# Patient Record
Sex: FEMALE | Race: WHITE
[De-identification: names, ages, dates, MRNs, and addresses within clinical notes are randomized per-mention and may not be internally consistent; named-entity substitution may affect disease eponyms.]

---

## 2017-07-15 ENCOUNTER — HEALTH MAINTENANCE LETTER (OUTPATIENT)
Age: 62
End: 2017-07-15

## 2018-03-05 NOTE — PROGRESS NOTES
"     SUBJECTIVE:   CC: Moy Huerta is an 62 year old woman who presents for preventive health visit.     Healthy Habits:    Do you get at least three servings of calcium containing foods daily (dairy, green leafy vegetables, etc.)? yes    Amount of exercise or daily activities, outside of work: 0 day(s) per week in winter more in summer-biking    Problems taking medications regularly No    Medication side effects: No    Have you had an eye exam in the past two years? no    Do you see a dentist twice per year? yes    Do you have sleep apnea, excessive snoring or daytime drowsiness? works third shift.      Today's PHQ-2 Score:   PHQ-2 ( 1999 Pf[employer]) 3/6/2018 5/11/2012   Q1: Little interest or pleasure in doing things 0 1   Q2: Feeling down, depressed or hopeless 0 1   PHQ-2 Score 0 2       Abuse: Current or Past(Physical, Sexual or Emotional)- No  Do you feel safe in your environment - Yes    Social History   Substance Use Topics     Smoking status: Never Smoker     Smokeless tobacco: Never Used     Alcohol use No     If you drink alcohol do you typically have >3 drinks per day or >7 drinks per week? No                     Reviewed orders with patient.  Reviewed health maintenance and updated orders accordingly - Yes  Labs reviewed in EPIC    Patient under age 50, mutual decision reflected in health maintenance.      Pertinent mammograms are reviewed under the imaging tab.  History of abnormal Pap smear: N/A    Reviewed and updated as needed this visit by clinical staff  Tobacco  Allergies  Meds         Reviewed and updated as needed this visit by Provider        No past medical history on file.     ROS:  Other than what is noted in the HPI and PMH a complete review of systems is otherwise negative including: Constitutional, HEENT, endocrine, cardiovascular, respiratory, GI/, musculoskeletal, neuro, and psychiatric.     OBJECTIVE:   /90  Pulse 84  Temp 97.8  F (36.6  C) (Oral)  Ht 5' 7.28\" (1.709 m) "  Wt 182 lb 12.8 oz (82.9 kg)  SpO2 97%  BMI 28.39 kg/m2  EXAM:  GENERAL: healthy, alert and no distress  EYES: Eyes grossly normal to inspection, PERRL and conjunctivae and sclerae normal  HENT: ear canals and TM's normal, nose and mouth without ulcers or lesions  NECK: no adenopathy, no asymmetry, masses, or scars and thyroid normal to palpation  RESP: lungs clear to auscultation - no rales, rhonchi or wheezes  BREAST: normal without masses, tenderness or nipple discharge and no palpable axillary masses or adenopathy  CV: regular rate and rhythm, normal S1 S2, no S3 or S4, no murmur, click or rub, no peripheral edema and peripheral pulses strong  ABDOMEN: soft, nontender, no hepatosplenomegaly, no masses and bowel sounds normal  MS: no gross musculoskeletal defects noted, no edema  SKIN: no suspicious lesions or rashes  NEURO: Normal strength and tone, mentation intact and speech normal  PSYCH: mentation appears normal, affect normal/bright    ASSESSMENT/PLAN:       ICD-10-CM    1. Encounter for routine adult health examination with abnormal findings Z00.01 *MA Screening Digital Bilateral     PSA, screen     Fecal colorectal cancer screen (FIT)     Hepatitis C Screen Reflex to HCV RNA Quant and Genotype     OPTOMETRY REFERRAL   2. Essential hypertension with goal blood pressure less than 140/90 I10 Albumin Random Urine Quantitative with Creat Ratio     Basic metabolic panel     losartan (COZAAR) 50 MG tablet     **Basic metabolic panel FUTURE 14d   3. Hyperlipidemia LDL goal <130 E78.5 Lipid panel reflex to direct LDL Fasting   4. Gender identity disorder in adult F64.0        Will start losartan 50mg daily. Recheck blood pressure and BMP in 2 weeks.     Routine and screening labs today. Screenings discussed.       COUNSELING:   Reviewed preventive health counseling, as reflected in patient instructions       reports that she has never smoked. She has never used smokeless tobacco.    Estimated body mass index is  "28.39 kg/(m^2) as calculated from the following:    Height as of this encounter: 5' 7.28\" (1.709 m).    Weight as of this encounter: 182 lb 12.8 oz (82.9 kg).       Counseling Resources:  ATP IV Guidelines  Pooled Cohorts Equation Calculator  Breast Cancer Risk Calculator  FRAX Risk Assessment  ICSI Preventive Guidelines  Dietary Guidelines for Americans, 2010  USDA's MyPlate  ASA Prophylaxis  Lung CA Screening    Nandini Padilla PA-C  [facility] [place]  "

## 2018-03-05 NOTE — PATIENT INSTRUCTIONS
Before Your Surgery      Call your surgeon if there is any change in your health. This includes signs of a cold or flu (such as a sore throat, runny nose, cough, rash or fever).    Do not smoke, drink alcohol or take over the counter medicine (unless your surgeon or primary care doctor tells you to) for the 24 hours before and after surgery.    If you take prescribed drugs: Follow your doctor s orders about which medicines to take and which to stop until after surgery.    Eating and drinking prior to surgery: follow the instructions from your surgeon    Take a shower or bath the night before surgery. Use the soap your surgeon gave you to gently clean your skin. If you do not have soap from your surgeon, use your regular soap. Do not shave or scrub the surgery site.  Wear clean pajamas and have clean sheets on your bed.     Preventive Health Recommendations  Female Ages 50 - 64    Yearly exam: See your health care provider every year in order to  o Review health changes.   o Discuss preventive care.    o Review your medicines if your doctor has prescribed any.      Get a Pap test every three years (unless you have an abnormal result and your provider advises testing more often).    If you get Pap tests with HPV test, you only need to test every 5 years, unless you have an abnormal result.     You do not need a Pap test if your uterus was removed (hysterectomy) and you have not had cancer.    You should be tested each year for STDs (sexually transmitted diseases) if you're at risk.     Have a mammogram every 1 to 2 years.    Have a colonoscopy at age 50, or have a yearly FIT test (stool test). These exams screen for colon cancer.      Have a cholesterol test every 5 years, or more often if advised.    Have a diabetes test (fasting glucose) every three years. If you are at risk for diabetes, you should have this test more often.     If you are at risk for osteoporosis (brittle bone disease), think about having a bone  density scan (DEXA).    Shots: Get a flu shot each year. Get a tetanus shot every 10 years.    Nutrition:     Eat at least 5 servings of fruits and vegetables each day.    Eat whole-grain bread, whole-wheat pasta and brown rice instead of white grains and rice.    Talk to your provider about Calcium and Vitamin D.     Lifestyle    Exercise at least 150 minutes a week (30 minutes a day, 5 days a week). This will help you control your weight and prevent disease.    Limit alcohol to one drink per day.    No smoking.     Wear sunscreen to prevent skin cancer.     See your dentist every six months for an exam and cleaning.    See your eye doctor every 1 to 2 years.

## 2018-03-06 ENCOUNTER — OFFICE VISIT (OUTPATIENT)
Dept: FAMILY MEDICINE | Facility: CLINIC | Age: 63
End: 2018-03-06
Payer: COMMERCIAL

## 2018-03-06 VITALS
BODY MASS INDEX: 28.69 KG/M2 | WEIGHT: 182.8 LBS | TEMPERATURE: 97.8 F | HEART RATE: 84 BPM | HEIGHT: 67 IN | OXYGEN SATURATION: 97 % | DIASTOLIC BLOOD PRESSURE: 90 MMHG | SYSTOLIC BLOOD PRESSURE: 166 MMHG

## 2018-03-06 DIAGNOSIS — F64.0 GENDER IDENTITY DISORDER IN ADULT: ICD-10-CM

## 2018-03-06 DIAGNOSIS — Z00.01 ENCOUNTER FOR ROUTINE ADULT HEALTH EXAMINATION WITH ABNORMAL FINDINGS: Primary | ICD-10-CM

## 2018-03-06 DIAGNOSIS — I10 ESSENTIAL HYPERTENSION WITH GOAL BLOOD PRESSURE LESS THAN 140/90: ICD-10-CM

## 2018-03-06 DIAGNOSIS — E78.5 HYPERLIPIDEMIA LDL GOAL <130: ICD-10-CM

## 2018-03-06 LAB
ANION GAP SERPL CALCULATED.3IONS-SCNC: 7 MMOL/L (ref 3–14)
BUN SERPL-MCNC: 17 MG/DL (ref 7–30)
CALCIUM SERPL-MCNC: 8.5 MG/DL (ref 8.5–10.1)
CHLORIDE SERPL-SCNC: 107 MMOL/L (ref 94–109)
CHOLEST SERPL-MCNC: 217 MG/DL
CO2 SERPL-SCNC: 26 MMOL/L (ref 20–32)
CREAT SERPL-MCNC: 0.86 MG/DL (ref 0.52–1.04)
CREAT UR-MCNC: 310 MG/DL
GFR SERPL CREATININE-BSD FRML MDRD: 68 ML/MIN/1.7M2
GLUCOSE SERPL-MCNC: 88 MG/DL (ref 70–99)
HDLC SERPL-MCNC: 85 MG/DL
LDLC SERPL CALC-MCNC: 116 MG/DL
MICROALBUMIN UR-MCNC: 21 MG/L
MICROALBUMIN/CREAT UR: 6.65 MG/G CR (ref 0–25)
NONHDLC SERPL-MCNC: 132 MG/DL
POTASSIUM SERPL-SCNC: 3.8 MMOL/L (ref 3.4–5.3)
PSA SERPL-ACNC: <0.01 UG/L
SODIUM SERPL-SCNC: 140 MMOL/L (ref 133–144)
TRIGL SERPL-MCNC: 80 MG/DL

## 2018-03-06 PROCEDURE — 36415 COLL VENOUS BLD VENIPUNCTURE: CPT | Performed by: PHYSICIAN ASSISTANT

## 2018-03-06 PROCEDURE — 99386 PREV VISIT NEW AGE 40-64: CPT | Performed by: PHYSICIAN ASSISTANT

## 2018-03-06 PROCEDURE — 82043 UR ALBUMIN QUANTITATIVE: CPT | Performed by: PHYSICIAN ASSISTANT

## 2018-03-06 PROCEDURE — G0103 PSA SCREENING: HCPCS | Mod: KX | Performed by: PHYSICIAN ASSISTANT

## 2018-03-06 PROCEDURE — 99213 OFFICE O/P EST LOW 20 MIN: CPT | Mod: 25 | Performed by: PHYSICIAN ASSISTANT

## 2018-03-06 PROCEDURE — 80048 BASIC METABOLIC PNL TOTAL CA: CPT | Performed by: PHYSICIAN ASSISTANT

## 2018-03-06 PROCEDURE — 86803 HEPATITIS C AB TEST: CPT | Performed by: PHYSICIAN ASSISTANT

## 2018-03-06 PROCEDURE — 80061 LIPID PANEL: CPT | Performed by: PHYSICIAN ASSISTANT

## 2018-03-06 RX ORDER — LOSARTAN POTASSIUM 50 MG/1
50 TABLET ORAL DAILY
Qty: 30 TABLET | Refills: 1 | Status: SHIPPED | OUTPATIENT
Start: 2018-03-06 | End: 2018-03-21

## 2018-03-06 RX ORDER — HYDROCHLOROTHIAZIDE 25 MG/1
25 TABLET ORAL DAILY
Qty: 30 TABLET | Refills: 6 | Status: CANCELLED | OUTPATIENT
Start: 2018-03-06

## 2018-03-06 RX ORDER — AMLODIPINE BESYLATE 10 MG/1
10 TABLET ORAL DAILY
Qty: 30 TABLET | Refills: 6 | Status: CANCELLED | OUTPATIENT
Start: 2018-03-06

## 2018-03-06 NOTE — MR AVS SNAPSHOT
After Visit Summary   3/6/2018    Moy Huerta    MRN: [id number]           Patient Information     Date Of Birth          1955        Visit Information        Provider Department      3/6/2018 10:00 AM Nandini Padilla PA-C [facility]        Today's Diagnoses     Encounter for routine adult health examination with abnormal findings    -  1    Essential hypertension with goal blood pressure less than 140/90        Hyperlipidemia LDL goal <130          Care Instructions      Before Your Surgery      Call your surgeon if there is any change in your health. This includes signs of a cold or flu (such as a sore throat, runny nose, cough, rash or fever).    Do not smoke, drink alcohol or take over the counter medicine (unless your surgeon or primary care doctor tells you to) for the 24 hours before and after surgery.    If you take prescribed drugs: Follow your doctor s orders about which medicines to take and which to stop until after surgery.    Eating and drinking prior to surgery: follow the instructions from your surgeon    Take a shower or bath the night before surgery. Use the soap your surgeon gave you to gently clean your skin. If you do not have soap from your surgeon, use your regular soap. Do not shave or scrub the surgery site.  Wear clean pajamas and have clean sheets on your bed.     Preventive Health Recommendations  Female Ages 50 - 64    Yearly exam: See your health care provider every year in order to  o Review health changes.   o Discuss preventive care.    o Review your medicines if your doctor has prescribed any.      Get a Pap test every three years (unless you have an abnormal result and your provider advises testing more often).    If you get Pap tests with HPV test, you only need to test every 5 years, unless you have an abnormal result.     You do not need a Pap test if your uterus was removed (hysterectomy) and you have not had cancer.    You should be  tested each year for STDs (sexually transmitted diseases) if you're at risk.     Have a mammogram every 1 to 2 years.    Have a colonoscopy at age 50, or have a yearly FIT test (stool test). These exams screen for colon cancer.      Have a cholesterol test every 5 years, or more often if advised.    Have a diabetes test (fasting glucose) every three years. If you are at risk for diabetes, you should have this test more often.     If you are at risk for osteoporosis (brittle bone disease), think about having a bone density scan (DEXA).    Shots: Get a flu shot each year. Get a tetanus shot every 10 years.    Nutrition:     Eat at least 5 servings of fruits and vegetables each day.    Eat whole-grain bread, whole-wheat pasta and brown rice instead of white grains and rice.    Talk to your provider about Calcium and Vitamin D.     Lifestyle    Exercise at least 150 minutes a week (30 minutes a day, 5 days a week). This will help you control your weight and prevent disease.    Limit alcohol to one drink per day.    No smoking.     Wear sunscreen to prevent skin cancer.     See your dentist every six months for an exam and cleaning.    See your eye doctor every 1 to 2 years.              Follow-ups after your visit        Additional Services     OPTOMETRY REFERRAL       Your provider has referred you to: [facility]: [facility] [phone]    [email or web address]    Please be aware that coverage of these services is subject to the terms and limitations of your health insurance plan.  Call member services at your health plan with any benefit or coverage questions.      Please bring the following with you to your appointment:    (1) Any X-Rays, CTs or MRIs which have been performed.  Contact the facility where they were done to arrange for  prior to your scheduled appointment.    (2) List of current medications  (3) This referral request   (4) Any documents/labs given to you  "for this referral                  Follow-up notes from your care team     Return in about 2 weeks (around 3/20/2018) for a BP recheck with repeat labs.      Future tests that were ordered for you today     Open Future Orders        Priority Expected Expires Ordered    *MA Screening Digital Bilateral Routine  3/6/2019 3/6/2018    Fecal colorectal cancer screen (FIT) Routine 3/27/2018 5/29/2018 3/6/2018    **Basic metabolic panel FUTURE 14d Routine 3/13/2018 3/20/2018 3/6/2018            Who to contact     Normal or non-critical lab and imaging results will be communicated to you by [employer], letter or phone within 4 business days after the clinic has received the results. If you do not hear from us within 7 days, please contact the clinic through [employer]t or phone. If you have a critical or abnormal lab result, we will notify you by phone as soon as possible.  Submit refill requests through [facility] or call your pharmacy and they will forward the refill request to us. Please allow 3 business days for your refill to be completed.          If you need to speak with a  for additional information , please call: [phone]             Additional Information About Your Visit        [facility] Information     [facility] gives you secure access to your electronic health record. If you see a primary care provider, you can also send messages to your care team and make appointments. If you have questions, please call your primary care clinic.  If you do not have a primary care provider, please call [phone] and they will assist you.        Care EveryWhere ID     This is your Care EveryWhere ID. This could be used by other organizations to access your [facility] medical records  [id number]        Your Vitals Were     Pulse Temperature Height Pulse Oximetry BMI (Body Mass Index)       84 97.8  F (36.6  C) (Oral) 5' 7.28\" (1.709 m) 97% 28.39 kg/m2        Blood Pressure from Last 3 Encounters:   03/06/18 (!) 169/105 "   04/26/13 126/82   09/28/12 126/84    Weight from Last 3 Encounters:   03/06/18 182 lb 12.8 oz (82.9 kg)   04/26/13 177 lb (80.3 kg)   09/28/12 177 lb (80.3 kg)              We Performed the Following     Albumin Random Urine Quantitative with Creat Ratio     Basic metabolic panel     Hepatitis C Screen Reflex to HCV RNA Quant and Genotype     Lipid panel reflex to direct LDL Fasting     OPTOMETRY REFERRAL     PSA, screen          Today's Medication Changes          These changes are accurate as of 3/6/18 10:39 AM.  If you have any questions, ask your nurse or doctor.               Start taking these medicines.        Dose/Directions    losartan 50 MG tablet   Commonly known as:  COZAAR   Used for:  Essential hypertension with goal blood pressure less than 140/90   Started by:  Nandini Padilla PA-C        Dose:  50 mg   Take 1 tablet (50 mg) by mouth daily - start with a half tab daily x1 week   Quantity:  30 tablet   Refills:  1         Stop taking these medicines if you haven't already. Please contact your care team if you have questions.     amLODIPine 10 MG tablet   Commonly known as:  NORVASC   Stopped by:  Nandini Padilla PA-C           hydrochlorothiazide 25 MG tablet   Commonly known as:  HYDRODIURIL   Stopped by:  Nandini Padilla PA-C                Where to get your medicines      These medications were sent to [facility] [address] [place] [address]     Phone:  [phone]     losartan 50 MG tablet                Primary Care Provider Fax #    Physician No Ref-Primary [phone]       No address on file        Equal Access to Services     [facility]: Hadii faisal wisdomo Soelsy, waaxda luqadaha, qaybta kaalmada adeegyajayant, joe blank. So [facility] [phone].    ATENCIÓN: Si habla español, tiene a blank disposición servicios gratuitos de asistencia lingüística. Llame al [phone].    We comply with  applicable federal civil rights laws and [place] laws. We do not discriminate on the basis of race, color, national origin, age, disability, sex, sexual orientation, or gender identity.            Thank you!     Thank you for choosing [facility]  for your care. Our goal is always to provide you with excellent care. Hearing back from our patients is one way we can continue to improve our services. Please take a few minutes to complete the written survey that you may receive in the mail after your visit with us. Thank you!             Your Updated Medication List - Protect others around you: Learn how to safely use, store and throw away your medicines at www.disposemymeds.org.          This list is accurate as of 3/6/18 10:39 AM.  Always use your most recent med list.                   Brand Name Dispense Instructions for use Diagnosis    ESTRACE 1 MG tablet   Generic drug:  estradiol      Take 1 mg by mouth 2 times daily.        losartan 50 MG tablet    COZAAR    30 tablet    Take 1 tablet (50 mg) by mouth daily - start with a half tab daily x1 week    Essential hypertension with goal blood pressure less than 140/90       progesterone 100 MG capsule    PROMETRIUM     Take 100 mg by mouth 2 times daily.

## 2018-03-06 NOTE — NURSING NOTE
"Chief Complaint   Patient presents with     Physical       Initial BP (!) 169/105  Pulse 84  Temp 97.8  F (36.6  C) (Oral)  Ht 5' 7.28\" (1.709 m)  Wt 182 lb 12.8 oz (82.9 kg)  SpO2 97%  BMI 28.39 kg/m2 Estimated body mass index is 28.39 kg/(m^2) as calculated from the following:    Height as of this encounter: 5' 7.28\" (1.709 m).    Weight as of this encounter: 182 lb 12.8 oz (82.9 kg).  Medication Reconciliation: complete   Michelle Beverly MA      "

## 2018-03-07 LAB — HCV AB SERPL QL IA: NONREACTIVE

## 2018-03-08 DIAGNOSIS — Z00.01 ENCOUNTER FOR ROUTINE ADULT HEALTH EXAMINATION WITH ABNORMAL FINDINGS: ICD-10-CM

## 2018-03-08 PROCEDURE — 82274 ASSAY TEST FOR BLOOD FECAL: CPT | Performed by: PHYSICIAN ASSISTANT

## 2018-03-09 LAB — HEMOCCULT STL QL IA: NEGATIVE

## 2018-03-13 ENCOUNTER — RADIANT APPOINTMENT (OUTPATIENT)
Dept: MAMMOGRAPHY | Facility: CLINIC | Age: 63
End: 2018-03-13
Attending: PHYSICIAN ASSISTANT
Payer: COMMERCIAL

## 2018-03-13 DIAGNOSIS — Z12.31 VISIT FOR SCREENING MAMMOGRAM: ICD-10-CM

## 2018-03-13 PROCEDURE — 77067 SCR MAMMO BI INCL CAD: CPT | Mod: TC

## 2018-03-20 ENCOUNTER — ALLIED HEALTH/NURSE VISIT (OUTPATIENT)
Dept: NURSING | Facility: CLINIC | Age: 63
End: 2018-03-20
Payer: COMMERCIAL

## 2018-03-20 VITALS — DIASTOLIC BLOOD PRESSURE: 80 MMHG | HEART RATE: 61 BPM | SYSTOLIC BLOOD PRESSURE: 160 MMHG

## 2018-03-20 DIAGNOSIS — I10 ESSENTIAL HYPERTENSION WITH GOAL BLOOD PRESSURE LESS THAN 140/90: ICD-10-CM

## 2018-03-20 DIAGNOSIS — Z01.30 BP CHECK: Primary | ICD-10-CM

## 2018-03-20 LAB
ANION GAP SERPL CALCULATED.3IONS-SCNC: 10 MMOL/L (ref 3–14)
BUN SERPL-MCNC: 15 MG/DL (ref 7–30)
CALCIUM SERPL-MCNC: 8.7 MG/DL (ref 8.5–10.1)
CHLORIDE SERPL-SCNC: 104 MMOL/L (ref 94–109)
CO2 SERPL-SCNC: 25 MMOL/L (ref 20–32)
CREAT SERPL-MCNC: 0.78 MG/DL (ref 0.52–1.04)
GFR SERPL CREATININE-BSD FRML MDRD: 75 ML/MIN/1.7M2
GLUCOSE SERPL-MCNC: 91 MG/DL (ref 70–99)
POTASSIUM SERPL-SCNC: 4.1 MMOL/L (ref 3.4–5.3)
SODIUM SERPL-SCNC: 139 MMOL/L (ref 133–144)

## 2018-03-20 PROCEDURE — 99207 ZZC NO CHARGE NURSE ONLY: CPT

## 2018-03-20 PROCEDURE — 36415 COLL VENOUS BLD VENIPUNCTURE: CPT | Performed by: PHYSICIAN ASSISTANT

## 2018-03-20 PROCEDURE — 80048 BASIC METABOLIC PNL TOTAL CA: CPT | Performed by: PHYSICIAN ASSISTANT

## 2018-03-20 NOTE — PROGRESS NOTES
Moy Huerta is a 62 year old female who comes in today for a Blood Pressure check because of new medication.    *Document pulse and BP  *Use new set of vitals button for multiple readings.  *Use extended vitals for orthostatic    Vitals as recorded, a large cuff was used.    Patient is taking medication as prescribed  Patient is tolerating medications well.  Patient is not monitoring Blood Pressure at home.      Current complaints: none    Disposition: results routed to MD/JI      /80 (BP Location: Right arm, Patient Position: Sitting, Cuff Size: Adult Large)  Pulse 61      Patient has not taken medication since yesterday morning  Has rescheduled on ancillary for April 3rd recheck on BP when he has taken BP medication    Please mail all lab results  No phone calls if possible he works nights and sleeps days

## 2018-03-20 NOTE — MR AVS SNAPSHOT
After Visit Summary   3/20/2018    Moy Huerta    MRN: [id number]           Patient Information     Date Of Birth          1955        Visit Information        Provider Department      3/20/2018 10:45 AM [facility]        Today's Diagnoses     BP check    -  1    Essential hypertension with goal blood pressure less than 140/90           Follow-ups after your visit        Your next 10 appointments already scheduled     Apr 03, 2018  8:45 AM CDT   Nurse Only with [facility]   [facility] [place] ([facility] [place])    [address]  [place] [address] [phone]              Who to contact     If you have questions or need follow up information about today's clinic visit or your schedule please contact [facility] [place] directly at [phone].  Normal or non-critical lab and imaging results will be communicated to you by MyChart, letter or phone within 4 business days after the clinic has received the results. If you do not hear from us within 7 days, please contact the clinic through [employer] or phone. If you have a critical or abnormal lab result, we will notify you by phone as soon as possible.  Submit refill requests through [employer] or call your pharmacy and they will forward the refill request to us. Please allow 3 business days for your refill to be completed.          Additional Information About Your Visit        MyChart Information     [employer] gives you secure access to your electronic health record. If you see a primary care provider, you can also send messages to your care team and make appointments. If you have questions, please call your primary care clinic.  If you do not have a primary care provider, please call [phone] and they will assist you.        Care EveryWhere ID     This is your Care EveryWhere ID. This could be used by other organizations to access your [facility] medical records  [id number]        Your  Vitals Were     Pulse                   61            Blood Pressure from Last 3 Encounters:   03/20/18 160/80   03/06/18 166/90   04/26/13 126/82    Weight from Last 3 Encounters:   03/06/18 182 lb 12.8 oz (82.9 kg)   04/26/13 177 lb (80.3 kg)   09/28/12 177 lb (80.3 kg)              We Performed the Following     **Basic metabolic panel FUTURE 14d        Primary Care Provider Office Phone # Fax #    [facility] [phone]       [address] [facility] [address]        Equal Access to Services     [facility]: Hadii aad ku hadasho Soomaali, waaxda luqadaha, qaybta kaalmada adeegyada, joe dooley haydayon aderudolph lora . So [facility] [phone].    ATENCIÓN: Si habla español, tiene a blank disposición servicios gratuitos de asistencia lingüística. Llame al [phone].    We comply with applicable federal civil rights laws and [place] laws. We do not discriminate on the basis of race, color, national origin, age, disability, sex, sexual orientation, or gender identity.            Thank you!     Thank you for choosing [facility]  for your care. Our goal is always to provide you with excellent care. Hearing back from our patients is one way we can continue to improve our services. Please take a few minutes to complete the written survey that you may receive in the mail after your visit with us. Thank you!             Your Updated Medication List - Protect others around you: Learn how to safely use, store and throw away your medicines at www.disposemymeds.org.          This list is accurate as of 3/20/18 10:58 AM.  Always use your most recent med list.                   Brand Name Dispense Instructions for use Diagnosis    ESTRACE 1 MG tablet   Generic drug:  estradiol      Take 1 mg by mouth 2 times daily.        losartan 50 MG tablet    COZAAR    30 tablet    Take 1 tablet (50 mg) by mouth daily - start with a half tab daily x1 week    Essential hypertension with  goal blood pressure less than 140/90       progesterone 100 MG capsule    PROMETRIUM     Take 100 mg by mouth 2 times daily.

## 2018-03-21 ENCOUNTER — TELEPHONE (OUTPATIENT)
Dept: FAMILY MEDICINE | Facility: CLINIC | Age: 63
End: 2018-03-21

## 2018-03-21 DIAGNOSIS — I10 ESSENTIAL HYPERTENSION WITH GOAL BLOOD PRESSURE LESS THAN 140/90: ICD-10-CM

## 2018-03-21 RX ORDER — LOSARTAN POTASSIUM AND HYDROCHLOROTHIAZIDE 12.5; 1 MG/1; MG/1
1 TABLET ORAL DAILY
Qty: 30 TABLET | Refills: 1 | Status: SHIPPED | OUTPATIENT
Start: 2018-03-21 | End: 2018-04-18

## 2018-03-21 NOTE — TELEPHONE ENCOUNTER
Please call patient with the following info:    Blood pressure reviewed - still high. Would like to increase his medication. New prescription sent to pharmacy - recheck blood pressure AND BMP after 2 weeks. Please schedule

## 2018-03-21 NOTE — TELEPHONE ENCOUNTER
Patient returned call, informed him of result and recommendations. Appointment scheduled with nurse for BP check on 4/3/18. Per lab result note patient to check BMP yearly.      Entered by Nandini Padilla PA-C at 3/21/2018  7:39 AM   Moy,     Your repeat kidney function and electrolytes remain normal. We'll check this yearly.     Please contact me if you have additional questions or concerns.        Does he need to recheck in 2 weeks or yearly? Please advise

## 2018-03-21 NOTE — LETTER
[facility] [place]  [address]  [place] [address]  [phone]        April 10, 2018    Moy Huerta   [id number]  [place] [address]              Dear Moy Huerta    This is to remind you that your Basic profile is due.    You may call our office at [phone] to schedule an appointment.    Please disregard this notice if you have already had your labs drawn or made an appointment.        Sincerely,        Nandini Padilla PA-C

## 2018-04-04 ENCOUNTER — TELEPHONE (OUTPATIENT)
Dept: FAMILY MEDICINE | Facility: CLINIC | Age: 63
End: 2018-04-04

## 2018-04-04 NOTE — TELEPHONE ENCOUNTER
Patient calling, states he no longer has access to a computer. He would like his results mailed to address on file. Going forward, he doesn't use [employer], so would like all communication via phone or mail.

## 2018-04-18 ENCOUNTER — ALLIED HEALTH/NURSE VISIT (OUTPATIENT)
Dept: NURSING | Facility: CLINIC | Age: 63
End: 2018-04-18
Payer: COMMERCIAL

## 2018-04-18 VITALS — SYSTOLIC BLOOD PRESSURE: 138 MMHG | HEART RATE: 68 BPM | DIASTOLIC BLOOD PRESSURE: 76 MMHG

## 2018-04-18 DIAGNOSIS — I10 ESSENTIAL HYPERTENSION WITH GOAL BLOOD PRESSURE LESS THAN 140/90: Primary | ICD-10-CM

## 2018-04-18 DIAGNOSIS — I10 ESSENTIAL HYPERTENSION WITH GOAL BLOOD PRESSURE LESS THAN 140/90: ICD-10-CM

## 2018-04-18 LAB
ANION GAP SERPL CALCULATED.3IONS-SCNC: 9 MMOL/L (ref 3–14)
BUN SERPL-MCNC: 19 MG/DL (ref 7–30)
CALCIUM SERPL-MCNC: 8.5 MG/DL (ref 8.5–10.1)
CHLORIDE SERPL-SCNC: 106 MMOL/L (ref 94–109)
CO2 SERPL-SCNC: 25 MMOL/L (ref 20–32)
CREAT SERPL-MCNC: 0.85 MG/DL (ref 0.52–1.04)
GFR SERPL CREATININE-BSD FRML MDRD: 68 ML/MIN/1.7M2
GLUCOSE SERPL-MCNC: 82 MG/DL (ref 70–99)
POTASSIUM SERPL-SCNC: 3.9 MMOL/L (ref 3.4–5.3)
SODIUM SERPL-SCNC: 140 MMOL/L (ref 133–144)

## 2018-04-18 PROCEDURE — 80048 BASIC METABOLIC PNL TOTAL CA: CPT | Performed by: PHYSICIAN ASSISTANT

## 2018-04-18 PROCEDURE — 36415 COLL VENOUS BLD VENIPUNCTURE: CPT | Performed by: PHYSICIAN ASSISTANT

## 2018-04-18 PROCEDURE — 99207 ZZC NO CHARGE NURSE ONLY: CPT

## 2018-04-18 RX ORDER — LOSARTAN POTASSIUM AND HYDROCHLOROTHIAZIDE 12.5; 1 MG/1; MG/1
1 TABLET ORAL DAILY
Qty: 90 TABLET | Refills: 3 | Status: SHIPPED | OUTPATIENT
Start: 2018-04-18 | End: 2019-01-25

## 2018-04-18 NOTE — PROGRESS NOTES
SUBJECTIVE:  Moy Huerta is a 62 year old female who presents for a follow up evaluation of her hypertension.    The reason for the visit is:  a recent medication change    Patient is taking medication as prescribed  Patient is tolerating medications well.  Patient is not monitoring Blood Pressure at home.  Average readings if yes are N/A    Current complaints: none      Current Outpatient Prescriptions   Medication     losartan-hydrochlorothiazide (HYZAAR) 100-12.5 MG per tablet     estradiol (ESTRACE) 1 MG tablet     progesterone (PROMETRIUM) 100 MG capsule     No current facility-administered medications for this visit.        Allergies   Allergen Reactions     Citric Acid Swelling     Tongue Swelling         OBJECTIVE:  Please get a blood pressure AND a pulse.  A height is also needed if has not been done in the past year.    There were no vitals taken for this visit.    Vitals as recorded, a regular cuff was used.    ASSESSMENT:    Is the HYPERTENSION goal on the problem list? Yes  Patient Active Problem List   Diagnosis     Gender identity disorder in adult     Bunions     Hyperlipidemia LDL goal <130     Essential hypertension with goal blood pressure less than 140/90       Plan:  The patient's blood pressure is less than documented goal. The patient will be discharged home. CC: this note to the patient's primary provider. Michelle Beverly MA

## 2018-04-18 NOTE — MR AVS SNAPSHOT
After Visit Summary   4/18/2018    Moy Huerta    MRN: [id number]           Patient Information     Date Of Birth          1955        Visit Information        Provider Department      4/18/2018 8:30 AM [facility]LL[facility]        Today's Diagnoses     Essential hypertension with goal blood pressure less than 140/90    -  1       Follow-ups after your visit        Your next 10 appointments already scheduled     Apr 18, 2018  9:00 AM CDT   LAB with BE LAB   [place] [facility] ([facility])    [address]  [place] [id number] [phone]           Please do not eat 10-12 hours before your appointment if you are coming in fasting for labs on lipids, cholesterol, or glucose (sugar). This does not apply to pregnant women. Water, hot tea and black coffee (with nothing added) are okay. Do not drink other fluids, diet soda or chew gum.              Who to contact     If you have questions or need follow up information about today's clinic visit or your schedule please contact [facility] [place] directly at [phone].  Normal or non-critical lab and imaging results will be communicated to you by [employer], letter or phone within 4 business days after the clinic has received the results. If you do not hear from us within 7 days, please contact the clinic through [employer] or phone. If you have a critical or abnormal lab result, we will notify you by phone as soon as possible.  Submit refill requests through [facility] or call your pharmacy and they will forward the refill request to us. Please allow 3 business days for your refill to be completed.          Additional Information About Your Visit        [employer] Information     [facility] gives you secure access to your electronic health record. If you see a primary care provider, you can also send messages to your care team and make appointments. If you have questions, please call your primary care  clinic.  If you do not have a primary care provider, please call [phone] and they will assist you.        Care EveryWhere ID     This is your Care EveryWhere ID. This could be used by other organizations to access your [facility] medical records  [id number]        Your Vitals Were     Pulse                   68            Blood Pressure from Last 3 Encounters:   04/18/18 138/76   03/20/18 160/80   03/06/18 166/90    Weight from Last 3 Encounters:   03/06/18 182 lb 12.8 oz (82.9 kg)   04/26/13 177 lb (80.3 kg)   09/28/12 177 lb (80.3 kg)              Today, you had the following     No orders found for display       Primary Care Provider Office Phone # Fax #    [facility] [phone] [id number] [facility] [address]        Equal Access to Services     [facility]: Hadii aad ku hadasho Soomaali, waaxda luqadaha, qaybta kaalmada adeegyada, waxay ryleyin hayaan aderudolph lora . So [facility] [phone].    ATENCIÓN: Si habla español, tiene a blank disposición servicios gratuitos de asistencia lingüística. Llame al [phone].    We comply with applicable federal civil rights laws and [place] laws. We do not discriminate on the basis of race, color, national origin, age, disability, sex, sexual orientation, or gender identity.            Thank you!     Thank you for choosing [facility]3]  for your care. Our goal is always to provide you with excellent care. Hearing back from our patients is one way we can continue to improve our services. Please take a few minutes to complete the written survey that you may receive in the mail after your visit with us. Thank you!             Your Updated Medication List - Protect others around you: Learn how to safely use, store and throw away your medicines at www.disposemymeds.org.          This list is accurate as of 4/18/18  8:44 AM.  Always use your most recent med list.                   Brand Name Dispense Instructions  for use Diagnosis    ESTRACE 1 MG tablet   Generic drug:  estradiol      Take 1 mg by mouth 2 times daily.        losartan-hydrochlorothiazide 100-12.5 MG per tablet    HYZAAR    30 tablet    Take 1 tablet by mouth daily    Essential hypertension with goal blood pressure less than 140/90       progesterone 100 MG capsule    PROMETRIUM     Take 100 mg by mouth 2 times daily.

## 2018-09-07 ENCOUNTER — TRANSFERRED RECORDS (OUTPATIENT)
Dept: HEALTH INFORMATION MANAGEMENT | Facility: CLINIC | Age: 63
End: 2018-09-07

## 2019-01-25 ENCOUNTER — OFFICE VISIT (OUTPATIENT)
Dept: FAMILY MEDICINE | Facility: CLINIC | Age: 64
End: 2019-01-25
Payer: COMMERCIAL

## 2019-01-25 VITALS
HEIGHT: 68 IN | DIASTOLIC BLOOD PRESSURE: 100 MMHG | WEIGHT: 181.8 LBS | HEART RATE: 81 BPM | TEMPERATURE: 99.4 F | SYSTOLIC BLOOD PRESSURE: 160 MMHG | BODY MASS INDEX: 27.55 KG/M2

## 2019-01-25 DIAGNOSIS — I10 ESSENTIAL HYPERTENSION WITH GOAL BLOOD PRESSURE LESS THAN 140/90: ICD-10-CM

## 2019-01-25 DIAGNOSIS — F64.0 GENDER IDENTITY DISORDER IN ADULT: ICD-10-CM

## 2019-01-25 DIAGNOSIS — Z12.31 VISIT FOR SCREENING MAMMOGRAM: ICD-10-CM

## 2019-01-25 DIAGNOSIS — E78.5 HYPERLIPIDEMIA LDL GOAL <130: ICD-10-CM

## 2019-01-25 DIAGNOSIS — Z12.11 SCREEN FOR COLON CANCER: ICD-10-CM

## 2019-01-25 DIAGNOSIS — Z00.01 ENCOUNTER FOR ROUTINE ADULT MEDICAL EXAM WITH ABNORMAL FINDINGS: Primary | ICD-10-CM

## 2019-01-25 DIAGNOSIS — Z12.5 SCREENING FOR PROSTATE CANCER: ICD-10-CM

## 2019-01-25 DIAGNOSIS — Z23 NEED FOR PROPHYLACTIC VACCINATION AND INOCULATION AGAINST INFLUENZA: ICD-10-CM

## 2019-01-25 DIAGNOSIS — Z11.4 SCREENING FOR HIV (HUMAN IMMUNODEFICIENCY VIRUS): ICD-10-CM

## 2019-01-25 PROCEDURE — 90686 IIV4 VACC NO PRSV 0.5 ML IM: CPT | Performed by: PHYSICIAN ASSISTANT

## 2019-01-25 PROCEDURE — 99396 PREV VISIT EST AGE 40-64: CPT | Mod: 25 | Performed by: PHYSICIAN ASSISTANT

## 2019-01-25 PROCEDURE — 99213 OFFICE O/P EST LOW 20 MIN: CPT | Mod: 25 | Performed by: PHYSICIAN ASSISTANT

## 2019-01-25 PROCEDURE — 90471 IMMUNIZATION ADMIN: CPT | Performed by: PHYSICIAN ASSISTANT

## 2019-01-25 RX ORDER — LISINOPRIL AND HYDROCHLOROTHIAZIDE 20; 25 MG/1; MG/1
1 TABLET ORAL DAILY
Qty: 30 TABLET | Refills: 1 | Status: SHIPPED | OUTPATIENT
Start: 2019-01-25 | End: 2019-03-14

## 2019-01-25 RX ORDER — ESTRADIOL 2 MG/1
2 TABLET ORAL
Status: CANCELLED | OUTPATIENT
Start: 2019-01-25

## 2019-01-25 RX ORDER — ESTRADIOL 2 MG/1
2 TABLET ORAL
COMMUNITY

## 2019-01-25 ASSESSMENT — MIFFLIN-ST. JEOR: SCORE: 1434.27

## 2019-01-25 NOTE — PATIENT INSTRUCTIONS
Colonoscopy  Mammogram  Due for dental visit and eye check  Check with insurance for coverage of shingles immunization    Start blood pressure medication. Call if too expensive  Follow up in 2 weeks for lab visit and blood pressure check  Follow up in 6 months in clinic, sooner if needed    You are due for lab tests  - future lab order is already placed in computer system  Fasting labs: no food or drink (except water) for 12 hours before labs, make lab appointment   [facility] Lab Hours  Mon 7:45 am - 6:30 pm   Tues-Fri 7:45 am - 4:45 pm   Ph: [phone] - to schedule     [facility]     [place] [phone]     [place] [phone] Mobile Mammogram every 4th Thursday    [place] [phone]    [place] [phone]    [facility]     Mammogram Walk-In Hours ([place]) Monday-Friday 8am-4pm    Preventive Health Recommendations  Female Ages 50 - 64    Yearly exam: See your health care provider every year in order to  o Review health changes.   o Discuss preventive care.    o Review your medicines if your doctor has prescribed any.      Get a Pap test every three years (unless you have an abnormal result and your provider advises testing more often).    If you get Pap tests with HPV test, you only need to test every 5 years, unless you have an abnormal result.     You do not need a Pap test if your uterus was removed (hysterectomy) and you have not had cancer.    You should be tested each year for STDs (sexually transmitted diseases) if you're at risk.     Have a mammogram every 1 to 2 years.    Have a colonoscopy at age 50, or have a yearly FIT test (stool test). These exams screen for colon cancer.      Have a cholesterol test every 5 years, or more often if advised.    Have a diabetes test (fasting glucose) every three years. If you are at risk for diabetes, you should have this test more often.     If you are at risk  for osteoporosis (brittle bone disease), think about having a bone density scan (DEXA).    Shots: Get a flu shot each year. Get a tetanus shot every 10 years.    Nutrition:     Eat at least 5 servings of fruits and vegetables each day.    Eat whole-grain bread, whole-wheat pasta and brown rice instead of white grains and rice.    Get adequate Calcium and Vitamin D.     Lifestyle    Exercise at least 150 minutes a week (30 minutes a day, 5 days a week). This will help you control your weight and prevent disease.    Limit alcohol to one drink per day.    No smoking.     Wear sunscreen to prevent skin cancer.     See your dentist every six months for an exam and cleaning.    See your eye doctor every 1 to 2 years.

## 2019-01-25 NOTE — PROGRESS NOTES
SUBJECTIVE:   CC: Moy Huerta is an 63 year old woman who presents for preventive health visit.     Healthy Habits:    Do you get at least three servings of calcium containing foods daily (dairy, green leafy vegetables, etc.)? Patient is lactose intolerant     Amount of exercise or daily activities, outside of work: Works night shirt get little amount of exercise    Problems taking medications regularly No    Medication side effects: No    Have you had an eye exam in the past two years? No, she knows she is due    Do you see a dentist twice per year? yes    Do you have sleep apnea, excessive snoring or daytime drowsiness?no    Patient informed by [facility] that anything we discuss that is not related to preventative medicine, may be billed for; patient verbalizes understanding.   She checked with insurance, they will cover physical today (not quite 1 year)  *  No additional concerns today  * establish care. Lives/works in [place].   She works with machines packing boxes  Wasn't pleased with extra charges from last physical, went to the  to fight it    Blood pressure high when she checks  She stopped taking losartan as it was $80 so she stopped it  She denies CP or SOB   Doesn't know all of family history as she has not talked with family for 30 years when she started with gender  Has been seeing same NP specialist for years - progesterone + estrogen    See colonoscopy report 2012 ([employer]) - need better prep recommend repeat 5 years    Today's PHQ-2 Score:   PHQ-2 ( 1999 [employer]) 1/25/2019 3/6/2018   Q1: Little interest or pleasure in doing things 0 0   Q2: Feeling down, depressed or hopeless 0 0   PHQ-2 Score 0 0       Abuse: Current or Past(Physical, Sexual or Emotional)- No  Do you feel safe in your environment? Yes    Social History     Tobacco Use     Smoking status: Never Smoker     Smokeless tobacco: Never Used   Substance Use Topics     Alcohol use: No     If you drink alcohol do  you typically have >3 drinks per day or >7 drinks per week? No                     Reviewed orders with patient.  Reviewed health maintenance and updated orders accordingly - Yes  Labs reviewed in EPIC  BP Readings from Last 3 Encounters:   01/25/19 (!) 160/100   04/18/18 138/76   03/20/18 160/80    Wt Readings from Last 3 Encounters:   01/25/19 82.5 kg (181 lb 12.8 oz)   03/06/18 82.9 kg (182 lb 12.8 oz)   04/26/13 80.3 kg (177 lb)                  Patient Active Problem List   Diagnosis     Gender identity disorder in adult     Bunions     Hyperlipidemia LDL goal <130     Essential hypertension with goal blood pressure less than 140/90     Past Surgical History:   Procedure Laterality Date     NASAL ENDOSCOPY      after fracture     ORCHIECTOMY SCROTAL BILATERAL  2000     TONSILLECTOMY         Social History     Tobacco Use     Smoking status: Never Smoker     Smokeless tobacco: Never Used   Substance Use Topics     Alcohol use: No     Family History   Problem Relation Age of Onset     C.A.D. Brother      Hypertension Brother      Breast Cancer Mother      Hypertension Sister      Hypertension Brother      Parkinsonism Father      Diabetes No family hx of            Mammogram Screening: Patient over age 50, mutual decision to screen reflected in health maintenance.    Pertinent mammograms are reviewed under the imaging tab.       Reviewed and updated as needed this visit by clinical staff  Tobacco  Allergies  Meds  Med Hx  Surg Hx  Fam Hx  Soc Hx        Reviewed and updated as needed this visit by Provider  Tobacco  Allergies  Meds  Med Hx  Surg Hx  Fam Hx  Soc Hx       History reviewed. No pertinent past medical history.   Past Surgical History:   Procedure Laterality Date     NASAL ENDOSCOPY      after fracture     ORCHIECTOMY SCROTAL BILATERAL  2000     TONSILLECTOMY         ROS:  CONSTITUTIONAL: NEGATIVE for fever, chills, change in weight  INTEGUMENTARY/SKIN: NEGATIVE for worrisome rashes, moles  "or lesions  EYES: NEGATIVE for vision changes or irritation  ENT: NEGATIVE for ear, mouth and throat problems  RESP: NEGATIVE for significant cough or SOB  BREAST: NEGATIVE for masses, tenderness or discharge  CV: NEGATIVE for chest pain, palpitations or peripheral edema  GI: NEGATIVE for nausea, abdominal pain, heartburn, or change in bowel habits  : NEGATIVE for unusual urinary  MUSCULOSKELETAL: NEGATIVE for significant arthralgias or myalgia  NEURO: NEGATIVE for weakness, dizziness or paresthesias  PSYCHIATRIC: NEGATIVE for changes in mood or affect     OBJECTIVE:   BP (!) 160/100   Pulse 81   Temp 99.4  F (37.4  C) (Tympanic)   Ht 1.721 m (5' 7.76\")   Wt 82.5 kg (181 lb 12.8 oz)   BMI 27.84 kg/m    EXAM:  GENERAL: healthy, alert and no distress  EYES: Eyes grossly normal to inspection, PERRL and conjunctivae and sclerae normal  HENT: ear canals and TM's normal, nose and mouth without ulcers or lesions  NECK: no adenopathy, no asymmetry, masses, or scars and thyroid normal to palpation  RESP: lungs clear to auscultation - no rales, rhonchi or wheezes  BREAST: normal without masses, tenderness or nipple discharge and no palpable axillary masses or adenopathy  CV: regular rate and rhythm, normal S1 S2, no S3 or S4, no murmur, click or rub, no peripheral edema and peripheral pulses strong  ABDOMEN: soft, nontender, no hepatosplenomegaly, no masses and bowel sounds normal   (male): no  lesions or urethral discharge, no hernia. Post orchiectomy changes  RECTAL (male): normal sphincter tone, no rectal masses, prostate normal size, smooth, nontender without nodules or masses  MS: no gross musculoskeletal defects noted, no edema  SKIN: no suspicious lesions or rashes  NEURO: Normal strength and tone, mentation intact and speech normal  BACK: no CVA tenderness, no paralumbar tenderness  PSYCH: mentation appears normal, affect normal/bright  LYMPH: no cervical, supraclavicular, axillary, or inguinal " adenopathy    ASSESSMENT/PLAN:       ICD-10-CM    1. Encounter for routine adult medical exam with abnormal findings Z00.01    2. Essential hypertension with goal blood pressure less than 140/90 I10 **Basic metabolic panel FUTURE 1yr     lisinopril-hydrochlorothiazide (PRINZIDE/ZESTORETIC) 20-25 MG tablet   3. Gender identity disorder in adult F64.0    4. Need for prophylactic vaccination and inoculation against influenza Z23 FLU VACCINE, SPLIT VIRUS, IM (QUADRIVALENT) [35600]- >3 YRS     Vaccine Administration, Initial [27314]   5. Visit for screening mammogram Z12.31 *MA Screening Digital Bilateral   6. Screening for HIV (human immunodeficiency virus) Z11.4 HIV Screening   7. Hyperlipidemia LDL goal <130 E78.5 Lipid panel reflex to direct LDL Fasting   8. Screening for prostate cancer Z12.5 PSA, screen   9. Screen for colon cancer Z12.11 GASTROENTEROLOGY ADULT REF PROCEDURE ONLY Other; [place] [facility] [phone]       SAAD signed for Vanita José NP - [facility]  Advanced directive given    Colonoscopy  Mammogram  Due for dental visit and eye check  Check with insurance for coverage of shingles immunization    Start blood pressure medication. Call if too expensive   - we discussed risks of not treating hypertension, benefits of treating  Follow up in 2 weeks for lab visit and blood pressure check  Follow up in 6 months in clinic, sooner if needed    You are due for lab tests    Addendum: According to ASCVD risk calculator, patient is in statin benefit group, as 10-year ASCVD risk is 10.8% male, 4.8% female. Unknown given hormone use which to go by for true ASCVD risk. Did recommend patient may benefit from statin.     COUNSELING:   Reviewed preventive health counseling, as reflected in patient instructions       Regular exercise       Healthy diet/nutrition       Colon cancer screening       HIV screeninx in teen years, 1x in adult years, and at intervals if high risk    BP Readings from Last 1  "Encounters:   01/25/19 (!) 160/100     Estimated body mass index is 27.84 kg/m  as calculated from the following:    Height as of this encounter: 1.721 m (5' 7.76\").    Weight as of this encounter: 82.5 kg (181 lb 12.8 oz).    Weight management plan: Discussed healthy diet and exercise guidelines     reports that  has never smoked. she has never used smokeless tobacco.      Counseling Resources:  ATP IV Guidelines  Pooled Cohorts Equation Calculator  Breast Cancer Risk Calculator  FRAX Risk Assessment  ICSI Preventive Guidelines  Dietary Guidelines for Americans, 2010  USDA's MyPlate  ASA Prophylaxis  Lung CA Screening    Syeda Avalos PA-C  [facility]3]  "

## 2019-01-25 NOTE — PROGRESS NOTES

## 2019-02-13 DIAGNOSIS — Z12.5 SCREENING FOR PROSTATE CANCER: ICD-10-CM

## 2019-02-13 DIAGNOSIS — E78.5 HYPERLIPIDEMIA LDL GOAL <130: ICD-10-CM

## 2019-02-13 DIAGNOSIS — I10 ESSENTIAL HYPERTENSION WITH GOAL BLOOD PRESSURE LESS THAN 140/90: ICD-10-CM

## 2019-02-13 DIAGNOSIS — Z11.4 SCREENING FOR HIV (HUMAN IMMUNODEFICIENCY VIRUS): ICD-10-CM

## 2019-02-13 LAB
ANION GAP SERPL CALCULATED.3IONS-SCNC: 6 MMOL/L (ref 3–14)
BUN SERPL-MCNC: 15 MG/DL (ref 7–30)
CALCIUM SERPL-MCNC: 8.2 MG/DL (ref 8.5–10.1)
CHLORIDE SERPL-SCNC: 99 MMOL/L (ref 94–109)
CHOLEST SERPL-MCNC: 204 MG/DL
CO2 SERPL-SCNC: 30 MMOL/L (ref 20–32)
CREAT SERPL-MCNC: 0.75 MG/DL (ref 0.52–1.04)
GFR SERPL CREATININE-BSD FRML MDRD: 85 ML/MIN/{1.73_M2}
GLUCOSE SERPL-MCNC: 76 MG/DL (ref 70–99)
HDLC SERPL-MCNC: 72 MG/DL
LDLC SERPL CALC-MCNC: 112 MG/DL
NONHDLC SERPL-MCNC: 132 MG/DL
POTASSIUM SERPL-SCNC: 3.4 MMOL/L (ref 3.4–5.3)
PSA SERPL-ACNC: <0.01 UG/L
SODIUM SERPL-SCNC: 135 MMOL/L (ref 133–144)
TRIGL SERPL-MCNC: 101 MG/DL

## 2019-02-13 PROCEDURE — G0103 PSA SCREENING: HCPCS | Performed by: PHYSICIAN ASSISTANT

## 2019-02-13 PROCEDURE — 36415 COLL VENOUS BLD VENIPUNCTURE: CPT | Performed by: PHYSICIAN ASSISTANT

## 2019-02-13 PROCEDURE — 80048 BASIC METABOLIC PNL TOTAL CA: CPT | Performed by: PHYSICIAN ASSISTANT

## 2019-02-13 PROCEDURE — 80061 LIPID PANEL: CPT | Performed by: PHYSICIAN ASSISTANT

## 2019-02-13 PROCEDURE — 87389 HIV-1 AG W/HIV-1&-2 AB AG IA: CPT | Performed by: PHYSICIAN ASSISTANT

## 2019-02-14 LAB — HIV 1+2 AB+HIV1 P24 AG SERPL QL IA: NONREACTIVE

## 2019-02-15 ENCOUNTER — TRANSFERRED RECORDS (OUTPATIENT)
Dept: HEALTH INFORMATION MANAGEMENT | Facility: CLINIC | Age: 64
End: 2019-02-15

## 2019-02-19 ENCOUNTER — ANCILLARY PROCEDURE (OUTPATIENT)
Dept: MAMMOGRAPHY | Facility: CLINIC | Age: 64
End: 2019-02-19
Payer: COMMERCIAL

## 2019-02-19 DIAGNOSIS — Z12.31 VISIT FOR SCREENING MAMMOGRAM: ICD-10-CM

## 2019-02-19 PROCEDURE — 77067 SCR MAMMO BI INCL CAD: CPT | Mod: TC

## 2019-02-19 PROCEDURE — 77063 BREAST TOMOSYNTHESIS BI: CPT | Mod: TC

## 2019-03-14 ENCOUNTER — OFFICE VISIT (OUTPATIENT)
Dept: FAMILY MEDICINE | Facility: CLINIC | Age: 64
End: 2019-03-14
Payer: COMMERCIAL

## 2019-03-14 VITALS
HEART RATE: 74 BPM | BODY MASS INDEX: 26.8 KG/M2 | HEIGHT: 68 IN | WEIGHT: 176.8 LBS | DIASTOLIC BLOOD PRESSURE: 89 MMHG | TEMPERATURE: 99.5 F | SYSTOLIC BLOOD PRESSURE: 155 MMHG | RESPIRATION RATE: 12 BRPM | OXYGEN SATURATION: 98 %

## 2019-03-14 DIAGNOSIS — I10 ESSENTIAL HYPERTENSION WITH GOAL BLOOD PRESSURE LESS THAN 140/90: ICD-10-CM

## 2019-03-14 PROCEDURE — 99213 OFFICE O/P EST LOW 20 MIN: CPT | Performed by: PHYSICIAN ASSISTANT

## 2019-03-14 RX ORDER — LISINOPRIL AND HYDROCHLOROTHIAZIDE 20; 25 MG/1; MG/1
1 TABLET ORAL DAILY
Qty: 30 TABLET | Refills: 1 | Status: SHIPPED | OUTPATIENT
Start: 2019-03-14 | End: 2019-08-05

## 2019-03-14 ASSESSMENT — MIFFLIN-ST. JEOR: SCORE: 1411.59

## 2019-03-14 NOTE — PATIENT INSTRUCTIONS
Look into insurance coverage of sports medicine/orthopedist and physical therapy     Restart blood pressure medication  Lab visit and nurse visit for blood pressure check in 1 month

## 2019-03-14 NOTE — PROGRESS NOTES
SUBJECTIVE:   Moy Huerta is a 63 year old female who presents to clinic today for the following health issues:    Hypertension Follow-up      Outpatient blood pressures are not being checked.    Low Salt Diet: low salt    Amount of exercise or physical activity: None    Problems taking medications regularly: No, he has been out of blood pressure medication for the last 2 weeks    Medication side effects: none    Diet: regular (no restrictions)    Had bloodwork last week, not sure if they checked bmp   They checked hormones, they were good        Problem list and histories reviewed & adjusted, as indicated.  Additional history: as documented    Patient Active Problem List   Diagnosis     Gender identity disorder in adult     Bunions     Hyperlipidemia LDL goal <130     Essential hypertension with goal blood pressure less than 140/90     Past Surgical History:   Procedure Laterality Date     NASAL ENDOSCOPY      after fracture     ORCHIECTOMY SCROTAL BILATERAL  2000     TONSILLECTOMY         Social History     Tobacco Use     Smoking status: Never Smoker     Smokeless tobacco: Never Used   Substance Use Topics     Alcohol use: No     Family History   Problem Relation Age of Onset     C.A.D. Brother      Hypertension Brother      Breast Cancer Mother      Hypertension Sister      Hypertension Brother      Parkinsonism Father      Diabetes No family hx of          BP Readings from Last 3 Encounters:   03/14/19 155/89   01/25/19 (!) 160/100   04/18/18 138/76    Wt Readings from Last 3 Encounters:   03/14/19 80.2 kg (176 lb 12.8 oz)   01/25/19 82.5 kg (181 lb 12.8 oz)   03/06/18 82.9 kg (182 lb 12.8 oz)                  Labs reviewed in EPIC    Reviewed and updated as needed this visit by clinical staff  Tobacco  Allergies  Meds  Problems  Med Hx  Surg Hx  Fam Hx  Soc Hx        Reviewed and updated as needed this visit by Provider  Allergies  Problems  Med Hx  Surg Hx         ROS:  Other than noted  "above, general, HEENT, respiratory, cardiac, MS, and gastrointestinal systems are negative.     OBJECTIVE:     /89   Pulse 74   Temp 99.5  F (37.5  C) (Tympanic)   Resp 12   Ht 1.721 m (5' 7.76\")   Wt 80.2 kg (176 lb 12.8 oz)   SpO2 98%   BMI 27.08 kg/m    Body mass index is 27.08 kg/m .  GENERAL: healthy, alert and no distress  RESP: lungs clear to auscultation - no rales, rhonchi or wheezes  CV: regular rate and rhythm, normal S1 S2, no S3 or S4, no murmur, click or rub, no peripheral edema and peripheral pulses strong  MS: no gross musculoskeletal defects noted, no edema  Shoulders: full ROM, but painful flexion. Tender to palpate trapezius muscles      ASSESSMENT/PLAN:     ASSESSMENT/PLAN:      ICD-10-CM    1. Essential hypertension with goal blood pressure less than 140/90 I10 lisinopril-hydrochlorothiazide (PRINZIDE/ZESTORETIC) 20-25 MG tablet     **Basic metabolic panel FUTURE 1yr     At the end of the visit, brings up that sometimes his shoulders bother him, no injury. He will check with insurance on coverage of physical therapy, ortho visit  He stopped blood pressure med, didn't realize there was a refill. Recommended recheck labs and blood pressure after being on medication    Patient Instructions   Look into insurance coverage of sports medicine/orthopedist and physical therapy     Restart blood pressure medication  Lab visit and nurse visit for blood pressure check in 1 month    Syeda Avalos PA-C  [facility]  "

## 2019-04-05 ENCOUNTER — ALLIED HEALTH/NURSE VISIT (OUTPATIENT)
Dept: FAMILY MEDICINE | Facility: CLINIC | Age: 64
End: 2019-04-05
Payer: COMMERCIAL

## 2019-04-05 VITALS — DIASTOLIC BLOOD PRESSURE: 74 MMHG | SYSTOLIC BLOOD PRESSURE: 138 MMHG

## 2019-04-05 DIAGNOSIS — Z01.30 BLOOD PRESSURE CHECK: Primary | ICD-10-CM

## 2019-04-05 DIAGNOSIS — I10 ESSENTIAL HYPERTENSION WITH GOAL BLOOD PRESSURE LESS THAN 140/90: ICD-10-CM

## 2019-04-05 LAB
ANION GAP SERPL CALCULATED.3IONS-SCNC: 4 MMOL/L (ref 3–14)
BUN SERPL-MCNC: 17 MG/DL (ref 7–30)
CALCIUM SERPL-MCNC: 8.5 MG/DL (ref 8.5–10.1)
CHLORIDE SERPL-SCNC: 103 MMOL/L (ref 94–109)
CO2 SERPL-SCNC: 30 MMOL/L (ref 20–32)
CREAT SERPL-MCNC: 0.7 MG/DL (ref 0.52–1.04)
GFR SERPL CREATININE-BSD FRML MDRD: >90 ML/MIN/{1.73_M2}
GLUCOSE SERPL-MCNC: 87 MG/DL (ref 70–99)
POTASSIUM SERPL-SCNC: 3.3 MMOL/L (ref 3.4–5.3)
SODIUM SERPL-SCNC: 137 MMOL/L (ref 133–144)

## 2019-04-05 PROCEDURE — 99207 ZZC NO CHARGE NURSE ONLY: CPT

## 2019-04-05 PROCEDURE — 80048 BASIC METABOLIC PNL TOTAL CA: CPT | Performed by: PHYSICIAN ASSISTANT

## 2019-04-05 PROCEDURE — 36415 COLL VENOUS BLD VENIPUNCTURE: CPT | Performed by: PHYSICIAN ASSISTANT

## 2019-04-05 NOTE — PROGRESS NOTES
Patent here today for a blood pressure check. She takes her blood pressure medication every morning. Her blood pressure was 132/76 and 138/74. She is not feeling dizzy or lightheaded. Provider DOMINGA Dallas CMA

## 2019-05-03 ENCOUNTER — TELEPHONE (OUTPATIENT)
Dept: FAMILY MEDICINE | Facility: CLINIC | Age: 64
End: 2019-05-03

## 2019-05-03 DIAGNOSIS — M19.90 ARTHRITIS: Primary | ICD-10-CM

## 2019-05-03 NOTE — TELEPHONE ENCOUNTER
Reason for Call: Request for an order or referral:    Order or referral being requested: Moy is requesting a lyme test added to his BMP that is ordered.  States that he is still stiff and has constant aches in pains.  Something needs to be found out.  Please review and advise. Thank you..Brittany Goddard    Date needed: as soon as possible    Has the patient been seen by the PCP for this problem? YES    Phone number Patient can be reached at:  Home number on file [phone] (home)    Best Time:  Any time    Can we leave a detailed message on this number?  YES    Call taken on 5/3/2019 at 3:42 PM by Brittany Goddard

## 2019-05-05 NOTE — TELEPHONE ENCOUNTER
I can place order for Lymes, but aces and pains can be caused by many things and having f/u visit is most likely the best step to see what additional lab work should be done  Etc.  You also need f/u of you bp on your bp meds.

## 2019-05-07 NOTE — TELEPHONE ENCOUNTER
LEFT MESSAGE for Moy of appointment schedule for 5/13/19 at 10:15 for labs.  Reminded to call and schedule appointment with provider for follow up and b/p check..Brittany Goddard

## 2019-05-13 DIAGNOSIS — M19.90 ARTHRITIS: ICD-10-CM

## 2019-05-13 DIAGNOSIS — I10 ESSENTIAL HYPERTENSION WITH GOAL BLOOD PRESSURE LESS THAN 140/90: ICD-10-CM

## 2019-05-13 LAB
ANION GAP SERPL CALCULATED.3IONS-SCNC: 1 MMOL/L (ref 3–14)
BUN SERPL-MCNC: 15 MG/DL (ref 7–30)
CALCIUM SERPL-MCNC: 8.9 MG/DL (ref 8.5–10.1)
CHLORIDE SERPL-SCNC: 104 MMOL/L (ref 94–109)
CO2 SERPL-SCNC: 30 MMOL/L (ref 20–32)
CREAT SERPL-MCNC: 0.75 MG/DL (ref 0.52–1.04)
GFR SERPL CREATININE-BSD FRML MDRD: 86 ML/MIN/{1.73_M2}
GLUCOSE SERPL-MCNC: 95 MG/DL (ref 70–99)
POTASSIUM SERPL-SCNC: 3.8 MMOL/L (ref 3.4–5.3)
SODIUM SERPL-SCNC: 135 MMOL/L (ref 133–144)

## 2019-05-13 PROCEDURE — 80048 BASIC METABOLIC PNL TOTAL CA: CPT | Performed by: FAMILY MEDICINE

## 2019-05-13 PROCEDURE — 36415 COLL VENOUS BLD VENIPUNCTURE: CPT | Performed by: FAMILY MEDICINE

## 2019-05-13 PROCEDURE — 86618 LYME DISEASE ANTIBODY: CPT | Performed by: FAMILY MEDICINE

## 2019-05-14 LAB — B BURGDOR IGG+IGM SER QL: 0.04 (ref 0–0.89)

## 2019-05-24 ENCOUNTER — ANCILLARY PROCEDURE (OUTPATIENT)
Dept: GENERAL RADIOLOGY | Facility: CLINIC | Age: 64
End: 2019-05-24
Attending: PHYSICIAN ASSISTANT
Payer: COMMERCIAL

## 2019-05-24 ENCOUNTER — OFFICE VISIT (OUTPATIENT)
Dept: FAMILY MEDICINE | Facility: CLINIC | Age: 64
End: 2019-05-24
Payer: COMMERCIAL

## 2019-05-24 VITALS
OXYGEN SATURATION: 97 % | HEART RATE: 71 BPM | SYSTOLIC BLOOD PRESSURE: 135 MMHG | BODY MASS INDEX: 26.96 KG/M2 | HEIGHT: 68 IN | DIASTOLIC BLOOD PRESSURE: 80 MMHG | TEMPERATURE: 98.5 F | WEIGHT: 177.9 LBS

## 2019-05-24 DIAGNOSIS — M25.511 BILATERAL SHOULDER PAIN, UNSPECIFIED CHRONICITY: ICD-10-CM

## 2019-05-24 DIAGNOSIS — M25.562 PAIN IN BOTH KNEES, UNSPECIFIED CHRONICITY: ICD-10-CM

## 2019-05-24 DIAGNOSIS — M25.511 BILATERAL SHOULDER PAIN, UNSPECIFIED CHRONICITY: Primary | ICD-10-CM

## 2019-05-24 DIAGNOSIS — M25.561 PAIN IN BOTH KNEES, UNSPECIFIED CHRONICITY: ICD-10-CM

## 2019-05-24 DIAGNOSIS — M25.512 BILATERAL SHOULDER PAIN, UNSPECIFIED CHRONICITY: ICD-10-CM

## 2019-05-24 DIAGNOSIS — M25.512 BILATERAL SHOULDER PAIN, UNSPECIFIED CHRONICITY: Primary | ICD-10-CM

## 2019-05-24 LAB — CRP SERPL-MCNC: 7.9 MG/L (ref 0–8)

## 2019-05-24 PROCEDURE — 73565 X-RAY EXAM OF KNEES: CPT | Mod: FY

## 2019-05-24 PROCEDURE — 86038 ANTINUCLEAR ANTIBODIES: CPT | Performed by: PHYSICIAN ASSISTANT

## 2019-05-24 PROCEDURE — 73030 X-RAY EXAM OF SHOULDER: CPT | Mod: RT

## 2019-05-24 PROCEDURE — 99214 OFFICE O/P EST MOD 30 MIN: CPT | Performed by: PHYSICIAN ASSISTANT

## 2019-05-24 PROCEDURE — 86431 RHEUMATOID FACTOR QUANT: CPT | Performed by: PHYSICIAN ASSISTANT

## 2019-05-24 PROCEDURE — 36415 COLL VENOUS BLD VENIPUNCTURE: CPT | Performed by: PHYSICIAN ASSISTANT

## 2019-05-24 PROCEDURE — 73030 X-RAY EXAM OF SHOULDER: CPT | Mod: LT

## 2019-05-24 PROCEDURE — 86140 C-REACTIVE PROTEIN: CPT | Performed by: PHYSICIAN ASSISTANT

## 2019-05-24 ASSESSMENT — MIFFLIN-ST. JEOR: SCORE: 1416.58

## 2019-05-26 LAB — RHEUMATOID FACT SER NEPH-ACNC: <20 IU/ML (ref 0–20)

## 2019-05-28 LAB — ANA SER QL IF: NEGATIVE

## 2019-05-30 ENCOUNTER — OFFICE VISIT (OUTPATIENT)
Dept: ORTHOPEDICS | Facility: CLINIC | Age: 64
End: 2019-05-30
Payer: COMMERCIAL

## 2019-05-30 ENCOUNTER — ANCILLARY PROCEDURE (OUTPATIENT)
Dept: GENERAL RADIOLOGY | Facility: CLINIC | Age: 64
End: 2019-05-30
Attending: PEDIATRICS
Payer: COMMERCIAL

## 2019-05-30 VITALS
HEIGHT: 68 IN | SYSTOLIC BLOOD PRESSURE: 132 MMHG | DIASTOLIC BLOOD PRESSURE: 86 MMHG | BODY MASS INDEX: 26.83 KG/M2 | WEIGHT: 177 LBS

## 2019-05-30 DIAGNOSIS — M25.511 BILATERAL SHOULDER PAIN, UNSPECIFIED CHRONICITY: Primary | ICD-10-CM

## 2019-05-30 DIAGNOSIS — M25.562 BILATERAL KNEE PAIN: ICD-10-CM

## 2019-05-30 DIAGNOSIS — M25.562 LEFT KNEE PAIN, UNSPECIFIED CHRONICITY: ICD-10-CM

## 2019-05-30 DIAGNOSIS — M25.512 BILATERAL SHOULDER PAIN, UNSPECIFIED CHRONICITY: Primary | ICD-10-CM

## 2019-05-30 DIAGNOSIS — M25.561 BILATERAL KNEE PAIN: ICD-10-CM

## 2019-05-30 DIAGNOSIS — M75.31 CALCIFIC TENDONITIS OF RIGHT SHOULDER: ICD-10-CM

## 2019-05-30 PROCEDURE — 73560 X-RAY EXAM OF KNEE 1 OR 2: CPT | Mod: LT

## 2019-05-30 PROCEDURE — 99203 OFFICE O/P NEW LOW 30 MIN: CPT | Performed by: PEDIATRICS

## 2019-05-30 ASSESSMENT — MIFFLIN-ST. JEOR: SCORE: 1412.4

## 2019-05-30 NOTE — LETTER
5/30/2019         RE: Moy Huerta  [id number]  [place] [address]        Dear Colleague,    Thank you for referring your patient, Moy Huerta, to the [facility] [place]. Please see a copy of my visit note below.    Sports Medicine Clinic Visit    PCP: Clinic, [place]    Moy Huerta is a 63 year old female who is seen  in consultation at the request of  Syeda Avalos PA-C presenting with bilateral shoulder and knee pain.    Injury: She reports multiple joint pain for for ~ 5 months. She reports no injury. She reports her shoulder pain increases with reaching behind her back and overhead. Her knee pain increases with standing for prolonged periods of time on hard floors, left is worse. Her pain is inconsistent and mostly increases with activity.  PCP has done lab work up.    Location of Pain: bilateral knee and bilateral shoulder  Duration of Pain: 5 month(s)  Rating of Pain at worst: 9/10  Rating of Pain Currently: 2/10  Symptoms are better with: Heat and Rest  Symptoms are worse with: overhead motions and standing  Additional Features:   Positive: weakness   Negative: swelling, bruising, popping, grinding, catching, locking, instability, paresthesias and numbness  Other evaluation and/or treatments so far consists of: Nothing  Prior History of related problems: nothing    Social History: ,     Review of Systems  Skin: no bruising, mild knee swelling  Musculoskeletal: as above  Neurologic: no numbness, paresthesias  Remainder of review of systems is negative including constitutional, CV, pulmonary, GI, except as noted in HPI or medical history.    Patient's current problem list, past medical and surgical history, and family history were reviewed.    Patient Active Problem List   Diagnosis     Gender identity disorder in adult     Bunions     Hyperlipidemia LDL goal <130     Essential hypertension with goal blood pressure less than  "140/90     No past medical history on file.  Past Surgical History:   Procedure Laterality Date     NASAL ENDOSCOPY      after fracture     ORCHIECTOMY SCROTAL BILATERAL  2000     TONSILLECTOMY       Family History   Problem Relation Age of Onset     C.A.D. Brother      Hypertension Brother      Breast Cancer Mother      Hypertension Sister      Hypertension Brother      Parkinsonism Father      Diabetes No family hx of          Objective  /86 (BP Location: Left arm, Patient Position: Chair, Cuff Size: Adult Regular)   Ht 1.721 m (5' 7.75\")   Wt 80.3 kg (177 lb)   BMI 27.11 kg/m       GENERAL APPEARANCE: healthy, alert and no distress   GAIT: NORMAL  SKIN: no suspicious lesions or rashes  HEENT: Sclera clear, anicteric  CV: good peripheral pulses  RESP: Breathing not labored  NEURO: Normal strength and tone, mentation intact and speech normal  PSYCH:  mentation appears normal and affect normal/bright    Bilateral Shoulder exam  Inspection and Posture:       rounded shoulders and upper back    Skin:        no visible deformities    Tender:        subacromial space bilateral    Non Tender:       remainder of shoulder bilateral    ROM:        forward flexion 140  bilateral       abduction 140 bilateral       internal rotation gluteal region bilateral       external rotation 45 bilateral       asymmetric scapular motion    Painful motions:       end range flexion and elevation bilateral    Strength:        abduction 5/5 bilateral       flexion 5/5 bilateral       internal rotation 5/5 bilateral       external rotation 5/5 bilateral    Impingement testing:       neg (-) Neer bilateral       positive (+) Pedersen bilateral    Sensation:        normal sensation over shoulder and upper extremity     Bilateral Knee exam    Inspection:      mild effusion left    Patella:      Normal patellar tracking noted through range of motion bilateral    Tender:      None currently    Non Tender:      remainder of knee area " bilateral    Knee ROM:      Full active and passive ROM with flexion and extension bilateral    Hip ROM:     Full active and passive ROM bilateral    Strength:      5/5 with knee extension bilateral    Special Tests:     neg (-) Kj bilateral       neg (-) Lachmans bilateral       neg (-) anterior drawer bilateral       neg (-) posterior drawer bilateral       neg (-) varus at 0 deg and 30 deg bilateral       neg (-) valgus at 0 deg and 30 deg bilateral    Gait:      normal    Neurovascular:      2+ peripheral pulses bilaterally and brisk capillary refill       sensation grossly intact    Radiology  I ordered, visualized and reviewed these images with the patient  Xr Knee Bilateral 1/2 Vw  Result Date: 5/30/2019  KNEE BILATERAL ONE TO TWO VIEWS   5/30/2019 1:47 PM HISTORY:  Bilateral knee pain. COMPARISON:  5/24/2019   IMPRESSION: Unremarkable exam. BEE DESAI MD    I visualized and reviewed these images with the patient  Xr Shoulder Left G/e 3 Views  Result Date: 5/24/2019  SHOULDER TWO VIEWS LEFT  5/24/2019 2:48 PM HISTORY: Bilateral shoulder pain, no injury, limited range of motion for nine months. COMPARISON: None. FINDINGS: Mild acromioclavicular joint degenerative change. Mild glenohumeral degenerative change. The acromioclavicular and coracoclavicular distances appear within normal limits. The subacromial space is maintained. There is no acute fracture or demonstrated dislocation. There are no worrisome bony lesions.   IMPRESSION: No acute osseous abnormality demonstrated. CARLA PRYOR MD    Xr Shoulder Right G/e 3 Views  Result Date: 5/24/2019  SHOULDER THREE VIEWS RIGHT  5/24/2019 2:48 PM HISTORY: Bilateral shoulder pain, limited range of motion for nine months. COMPARISON: None. FINDINGS: Calcific density lateral to the humeral head, probably calcific tendinitis versus less likely a joint body. Mild acromioclavicular joint degenerative change.  The acromioclavicular and coracoclavicular distances  appear within normal limits. The subacromial space is maintained. There is no acute fracture or demonstrated dislocation. There are no worrisome bony lesions.   IMPRESSION: No acute osseous abnormality demonstrated. CARLA PRYOR MD    Xr Knee Ap Standing Bilateral  Result Date: 5/25/2019  BOTH KNEES TWO VIEWS EACH SIDE STANDING  5/24/2019 2:47 PM HISTORY: Bilateral knee pain for nine months. Pain in both knees, unspecified chronicity. COMPARISON: None.   IMPRESSION: 1. Right knee: Medial and lateral joint spaces maintained. No evidence of fracture. 2. Left knee: Medial and lateral joint spaces maintained. No evidence of fracture. JENARO FRAZIER MD    Labs Reviewed:  Component      Latest Ref Rng & Units 5/24/2019   Rheumatoid Factor      <20 IU/mL <20   JOSE interpretation      NEG:Negative Negative   CRP Inflammation      0.0 - 8.0 mg/L 7.9       Assessment:  1. Bilateral shoulder pain, unspecified chronicity    2. Calcific tendonitis of right shoulder    3. Left knee pain, unspecified chronicity      1. Low suspicion for rotator cuff tear given current history and exam.  Evidence of calcific tendonitis on the right. Recommended rest from irritating activities coupled with physical therapy.  Would consider further imaging or treatment pending clinical course.    2. Knee pain likely exacerbation of mild arthritis with left knee currently with effusion.  Discussed supportive care for left knee including rest, compression, home exercises.  Could consider formal PT or MRI imaging pending clinical course.    Less likely inflammatory given prior work up - could consider Rheumatology referral.    Plan:  - Today's Plan of Care:  Rehab: Home Exercise Program  Physical Therapy: [facility] - [phone]  Medical Equipment: Knee brace    -We also discussed other future treatment options:  MRI of the knees or shoulders or Steroid injection of shoulders    Follow Up: 6 - 8 weeks    Concerning signs and  symptoms were reviewed.  The patient expressed understanding of this management plan and all questions were answered at this time.    Thanks for the opportunity to participate in the care of this patient, I will keep you updated on their progress.    CC: Syeda Mancini MD CA  Primary Care Sports Medicine  [facility]    Again, thank you for allowing me to participate in the care of your patient.        Sincerely,        Thelma Mancini MD

## 2019-05-30 NOTE — PROGRESS NOTES
Sports Medicine Clinic Visit    PCP: [facility]    Moy Huerta is a 63 year old female who is seen  in consultation at the request of  Syeda Avalos PA-C presenting with bilateral shoulder and knee pain.    Injury: She reports multiple joint pain for for ~ 5 months. She reports no injury. She reports her shoulder pain increases with reaching behind her back and overhead. Her knee pain increases with standing for prolonged periods of time on hard floors, left is worse. Her pain is inconsistent and mostly increases with activity.  PCP has done lab work up.    Location of Pain: bilateral knee and bilateral shoulder  Duration of Pain: 5 month(s)  Rating of Pain at worst: 9/10  Rating of Pain Currently: 2/10  Symptoms are better with: Heat and Rest  Symptoms are worse with: overhead motions and standing  Additional Features:   Positive: weakness   Negative: swelling, bruising, popping, grinding, catching, locking, instability, paresthesias and numbness  Other evaluation and/or treatments so far consists of: Nothing  Prior History of related problems: nothing    Social History: ,     Review of Systems  Skin: no bruising, mild knee swelling  Musculoskeletal: as above  Neurologic: no numbness, paresthesias  Remainder of review of systems is negative including constitutional, CV, pulmonary, GI, except as noted in HPI or medical history.    Patient's current problem list, past medical and surgical history, and family history were reviewed.    Patient Active Problem List   Diagnosis     Gender identity disorder in adult     Bunions     Hyperlipidemia LDL goal <130     Essential hypertension with goal blood pressure less than 140/90     No past medical history on file.  Past Surgical History:   Procedure Laterality Date     NASAL ENDOSCOPY      after fracture     ORCHIECTOMY SCROTAL BILATERAL  2000     TONSILLECTOMY       Family History   Problem Relation Age of Onset     C.A.D.  "Brother      Hypertension Brother      Breast Cancer Mother      Hypertension Sister      Hypertension Brother      Parkinsonism Father      Diabetes No family hx of          Objective  /86 (BP Location: Left arm, Patient Position: Chair, Cuff Size: Adult Regular)   Ht 1.721 m (5' 7.75\")   Wt 80.3 kg (177 lb)   BMI 27.11 kg/m      GENERAL APPEARANCE: healthy, alert and no distress   GAIT: NORMAL  SKIN: no suspicious lesions or rashes  HEENT: Sclera clear, anicteric  CV: good peripheral pulses  RESP: Breathing not labored  NEURO: Normal strength and tone, mentation intact and speech normal  PSYCH:  mentation appears normal and affect normal/bright    Bilateral Shoulder exam  Inspection and Posture:       rounded shoulders and upper back    Skin:        no visible deformities    Tender:        subacromial space bilateral    Non Tender:       remainder of shoulder bilateral    ROM:        forward flexion 140  bilateral       abduction 140 bilateral       internal rotation gluteal region bilateral       external rotation 45 bilateral       asymmetric scapular motion    Painful motions:       end range flexion and elevation bilateral    Strength:        abduction 5/5 bilateral       flexion 5/5 bilateral       internal rotation 5/5 bilateral       external rotation 5/5 bilateral    Impingement testing:       neg (-) Neer bilateral       positive (+) Pedersen bilateral    Sensation:        normal sensation over shoulder and upper extremity     Bilateral Knee exam    Inspection:      mild effusion left    Patella:      Normal patellar tracking noted through range of motion bilateral    Tender:      None currently    Non Tender:      remainder of knee area bilateral    Knee ROM:      Full active and passive ROM with flexion and extension bilateral    Hip ROM:     Full active and passive ROM bilateral    Strength:      5/5 with knee extension bilateral    Special Tests:     neg (-) Kj bilateral       neg (-) " Lachmans bilateral       neg (-) anterior drawer bilateral       neg (-) posterior drawer bilateral       neg (-) varus at 0 deg and 30 deg bilateral       neg (-) valgus at 0 deg and 30 deg bilateral    Gait:      normal    Neurovascular:      2+ peripheral pulses bilaterally and brisk capillary refill       sensation grossly intact    Radiology  I ordered, visualized and reviewed these images with the patient  Xr Knee Bilateral 1/2 Vw  Result Date: 5/30/2019  KNEE BILATERAL ONE TO TWO VIEWS   5/30/2019 1:47 PM HISTORY:  Bilateral knee pain. COMPARISON:  5/24/2019   IMPRESSION: Unremarkable exam. BEE DESAI MD    I visualized and reviewed these images with the patient  Xr Shoulder Left G/e 3 Views  Result Date: 5/24/2019  SHOULDER TWO VIEWS LEFT  5/24/2019 2:48 PM HISTORY: Bilateral shoulder pain, no injury, limited range of motion for nine months. COMPARISON: None. FINDINGS: Mild acromioclavicular joint degenerative change. Mild glenohumeral degenerative change. The acromioclavicular and coracoclavicular distances appear within normal limits. The subacromial space is maintained. There is no acute fracture or demonstrated dislocation. There are no worrisome bony lesions.   IMPRESSION: No acute osseous abnormality demonstrated. CARLA PRYOR MD    Xr Shoulder Right G/e 3 Views  Result Date: 5/24/2019  SHOULDER THREE VIEWS RIGHT  5/24/2019 2:48 PM HISTORY: Bilateral shoulder pain, limited range of motion for nine months. COMPARISON: None. FINDINGS: Calcific density lateral to the humeral head, probably calcific tendinitis versus less likely a joint body. Mild acromioclavicular joint degenerative change.  The acromioclavicular and coracoclavicular distances appear within normal limits. The subacromial space is maintained. There is no acute fracture or demonstrated dislocation. There are no worrisome bony lesions.   IMPRESSION: No acute osseous abnormality demonstrated. CARLA PRYOR MD    Xr Knee Ap Standing  Bilateral  Result Date: 5/25/2019  BOTH KNEES TWO VIEWS EACH SIDE STANDING  5/24/2019 2:47 PM HISTORY: Bilateral knee pain for nine months. Pain in both knees, unspecified chronicity. COMPARISON: None.   IMPRESSION: 1. Right knee: Medial and lateral joint spaces maintained. No evidence of fracture. 2. Left knee: Medial and lateral joint spaces maintained. No evidence of fracture. JENARO FRAZIER MD    Labs Reviewed:  Component      Latest Ref Rng & Units 5/24/2019   Rheumatoid Factor      <20 IU/mL <20   JOSE interpretation      NEG:Negative Negative   CRP Inflammation      0.0 - 8.0 mg/L 7.9       Assessment:  1. Bilateral shoulder pain, unspecified chronicity    2. Calcific tendonitis of right shoulder    3. Left knee pain, unspecified chronicity      1. Low suspicion for rotator cuff tear given current history and exam.  Evidence of calcific tendonitis on the right. Recommended rest from irritating activities coupled with physical therapy.  Would consider further imaging or treatment pending clinical course.    2. Knee pain likely exacerbation of mild arthritis with left knee currently with effusion.  Discussed supportive care for left knee including rest, compression, home exercises.  Could consider formal PT or MRI imaging pending clinical course.    Less likely inflammatory given prior work up - could consider Rheumatology referral.    Plan:  - Today's Plan of Care:  Rehab: Home Exercise Program  Physical Therapy: [facility] - [phone]  Medical Equipment: Knee brace    -We also discussed other future treatment options:  MRI of the knees or shoulders or Steroid injection of shoulders    Follow Up: 6 - 8 weeks    Concerning signs and symptoms were reviewed.  The patient expressed understanding of this management plan and all questions were answered at this time.    Thanks for the opportunity to participate in the care of this patient, I will keep you updated on their progress.    CC:  Syeda Mancini MD CAQ  Primary Care Sports Medicine  [facility]

## 2019-05-30 NOTE — PATIENT INSTRUCTIONS
Plan:  - Today's Plan of Care:  Rehab: Home Exercise Program  Physical Therapy: [facility] - [phone]  Medical Equipment: Knee brace    -We also discussed other future treatment options:  MRI of the knees or shoulders or Steroid injection of shoulders    Follow Up: 6 - 8 weeks    If you have any further questions for your physician or physician s care team you can call [phone] and use option 3 to leave a voice message. Calls received during business hours will be returned same day.

## 2019-05-31 ENCOUNTER — DOCUMENTATION ONLY (OUTPATIENT)
Dept: OTHER | Facility: CLINIC | Age: 64
End: 2019-05-31

## 2019-08-05 DIAGNOSIS — I10 ESSENTIAL HYPERTENSION WITH GOAL BLOOD PRESSURE LESS THAN 140/90: ICD-10-CM

## 2019-08-05 RX ORDER — LISINOPRIL AND HYDROCHLOROTHIAZIDE 20; 25 MG/1; MG/1
1 TABLET ORAL DAILY
Qty: 90 TABLET | Refills: 2 | Status: SHIPPED | OUTPATIENT
Start: 2019-08-05

## 2019-08-05 NOTE — TELEPHONE ENCOUNTER
"lisinopril-hydrochlorothiazide (PRINZIDE/ZESTORETIC) 20-25 MG tablet      Last Written Prescription Date:  3/14/19  Last Fill Quantity: 30,   # refills: 1  Last Office Visit: 5/24/19  Future Office visit:       Requested Prescriptions   Pending Prescriptions Disp Refills     lisinopril-hydrochlorothiazide (PRINZIDE/ZESTORETIC) 20-25 MG tablet 30 tablet 1     Sig: Take 1 tablet by mouth daily       Diuretics (Including Combos) Protocol Passed - 8/5/2019 10:11 AM        Passed - Blood pressure under 140/90 in past 12 months     BP Readings from Last 3 Encounters:   05/30/19 132/86   05/24/19 135/80   04/05/19 138/74                 Passed - Recent (12 mo) or future (30 days) visit within the authorizing provider's specialty     Patient had office visit in the last 12 months or has a visit in the next 30 days with authorizing provider or within the authorizing provider's specialty.  See \"Patient Info\" tab in inbasket, or \"Choose Columns\" in Meds & Orders section of the refill encounter.              Passed - Medication is active on med list        Passed - Patient is age 18 or older        Passed - No active pregancy on record        Passed - Normal serum creatinine on file in past 12 months     Recent Labs   Lab Test 05/13/19  1011   CR 0.75              Passed - Normal serum potassium on file in past 12 months     Recent Labs   Lab Test 05/13/19  1011   POTASSIUM 3.8                    Passed - Normal serum sodium on file in past 12 months     Recent Labs   Lab Test 05/13/19  1011                 Passed - No positive pregnancy test in past 12 months          "

## 2024-12-23 RX ORDER — TESTOSTERONE GEL, 1% 10 MG/G
GEL TRANSDERMAL
OUTPATIENT
Start: 2024-12-23

## 2024-12-23 RX ORDER — TESTOSTERONE GEL, 1% 10 MG/G
GEL TRANSDERMAL
COMMUNITY
Start: 2024-08-09

## 2024-12-23 NOTE — TELEPHONE ENCOUNTER
Outgoing call to Patient.  Patient confirms he is taking the medication, but that he has spoken with Racheal previously how he only uses it three times a week (M-W-F) or else it seems to be too much. RN confirmed pharmacy with Pt.  Patient does need a refill.    Patient said he previously got this from Racheal and spoke with Racheal about the usage of the medication.    Gina STEVENSON,  Triage RN  [facility]

## 2024-12-23 NOTE — TELEPHONE ENCOUNTER
Please call pt to verify medication, it is listed as historical   Thank you     Any Mcclure RN, BSN  [facility]

## 2024-12-26 NOTE — TELEPHONE ENCOUNTER
Contacted [facility] pharmacy to have them resend prescription to [facility] for refilling or to be forward on to Dr. Isabel Ceja.   Tanisha Brown RN

## 2024-12-26 NOTE — TELEPHONE ENCOUNTER
Pt sees Dr. Racheal Ceja for all HRT cares. Pt was previously prescribed an oral hormone medication and now Dr. Ceja changed to a gel which is working better for the patient. Pt is aware Dr. Ceja is out of the office today, but will still need a refill of his gel. His PCP is through [facility]. Dr. Vanessa Whittington [phone].   Tanisha Brown RN

## 2024-12-26 NOTE — TELEPHONE ENCOUNTER
I do not see any note of Racheal in epic  She is nurse practioner   Does this patient sees her in her private clinic or somewhere else  This patient needs to contact PCP or make appointment with Racheal Renae MD

## 2025-07-02 ENCOUNTER — TRANSFERRED RECORDS (OUTPATIENT)
Dept: ADMINISTRATIVE | Facility: CLINIC | Age: 70
End: 2025-07-02
Payer: COMMERCIAL

## 2025-07-03 NOTE — PROGRESS NOTES
"    _________________________________________________________________________________________________    [address] [address]  [place] [address]  [phone]      Patient:            Moy Huerta   YOB: 1955  Date:                    7/2/2025  Historian:    self  Visit Type:              Office Visit   Rendering Provider:  Tenisha DUMONT    History of Present Illness:    Moy is a 70-year-old patient seen in follow-up regarding anal itching.    In review, this patient was last seen in clinic for this concern on 12/16/2024.  Briefly, patient had reported several months of anal irritation and itching which did not respond well to topical antifungals.  Since last visit, patient completed a colonoscopy 12/31/2024 which showed normal terminal ileum, tiny polypoid lesions consistent with lymphoid aggregates throughout the colon, notable. Erythema without extension into the anus, mild internal hemorrhoids.  1 colon polyp was removed that was normal and lymphoid aggregate was present.  He was advised 10-year recall for screening.    Steroid suppository, miconazole creams and steroid creams are helpful temporarily but typically when stopped he has return of itching.    Patient did see dermatology and had punch biopsy which showed improving/minimal inflammation, no obvious cause or disease.  They were instructed to use capsaicin, desonide as needed 1-2 times daily for flares.    Moy describes ongoing issues with itching.  States dermatology advised desonide plus capsaicin plus triamcinolone as needed which feels reduces the symptoms somewhat but is still there. When patient is sitting it is okay but when up and walking the skin chaffs and this leads to irritation. Did try adding in Preparation H for period of time but this did not help much.  Patient is having bowel movements every other day that are \"sinkers\". Does feel that there is some incomplete emptying with frequent wiping. Patient does describe " some loss of typical hygiene in the last year.     Patient is not using Desitin on a regular basis or fiber.  Denies rectal pain, bleeding, pressure with bowel movements and does not feel any obvious hemorrhoids.    Rectal exam today performed with chaperone shows erythema improved from previous but now extending butt crease superiorly.  There is no pain on digital insertion and no palpable or bulges.    Assessment/Plan  # Detail Type Description   1. Assessment Pruritus ani (L29.0).   2. Assessment Internal hemorrhoids (K64.8).     Detail Type Description   Impression 70-year-old patient seen in follow-up for anal itching, idiopathic pruritus ani.     Workup has included colonoscopy, rectal punch biopsy with mild inflammation/internal hemorrhoids and obvious erythema in the perianal and buttock crease area.    Patient has tried several topical agents without much success.  At this time, rectal exam does not show any hemorrhoids that would benefit from banding.  I am more suspicious that their symptoms are related to the erythema and redness which is likely a combination of local irritation (inadequate hygiene), dietary triggers, contact dermatitis and idiopathic. They have been advised below therapies for this. No indication for hemorrhoid banding or procedures at this time.     Patient will follow up as needed if symptoms continue/worsen or they develop reasons for hemorrhoid interventions which were discussed.     Detail Type Description   Patient Plan Nice to see you today!     - Continue desonide, capsaicin cream per derm   - Recommend strict anal hygiene:   Use Gentle Cleansing Products: Opt for unscented, gentle soaps or cleansing wipes to avoid irritation.  Wipe Properly: After using the toilet, wipe from front to back to prevent the spread of bacteria - use baby wipes that are unscented after toileting and into the butt crease area as well after toileting   Pat Dry: Gently pat the area dry with a soft towel  after washing to prevent moisture buildup.  Wear Breathable Fabrics: Choose cotton underwear to allow the area to breathe and reduce moisture.  Stay Hydrated: Drinking plenty of water helps maintain regular bowel movements, reducing strain and irritation.  Maintain a Healthy Diet: A diet high in fiber can help prevent constipation and promote regular bowel movements.  Avoid Irritants: Steer clear of products with alcohol, fragrances, or dyes that can irritate the skin. Occasionally food triggers like Spicy foods, caffeine, alcohol, and other irritating foods and beverages can trigger or worsen symptoms   - Start fiber powder metamucil 1 tablespoon daily and 1 capful of miralax daily   - Try calmoseptine ointment/cream nightly   - Try desitin or butt paste before going on walks and also after each cleaning/toileting to help skin barrier  - If hemorrhoid symptoms (Bulging, itching, pain, bleeding rectally) are predominant we can see you otherwise dermatology and primary may be best place for ongoing itching concerns     - let us know if we can be helpful in other ways      HOW TO REACH ME  You can reach me by sending a message through your patient portal or calling my patient coordinator at [phone] ([facility] [employer] can be reached at this extension)    HOW TO SCHEDULE PROCEDURES OR LAB APPOINTMENTS  Call main line [phone] and PRESS 1 for scheduling     Orders    Instruction(s)/Education:  Instruction/Education Timeframe Assessment   Pruritus ani  L29.0       cc:  Vanessa Whittington MD  cc:       Electronically Signed by:  Tenisha DUMONT  07/02/2025 04:40 PM      Medications:  Medication Dose Sig Description Comments   aspirin 81 mg tablet,delayed release 81 mg take 1 tablet by oral route  every day taking as directed   atorvastatin 80 mg tablet 80 mg take 1 tablet by oral route  every day taking as directed   chlorthalidone 25 mg tablet 25 mg take 1 tablet by oral route  every day taking as directed    Estrace 2 mg Tab 2 mg take 1 tablet (2MG)  by oral route  every day taking as directed   estradiol 0.025 mg/24 hr semiweekly transdermal patch 0.025 mg/24 hour apply 1 patch by transdermal route 2 times every week cyclically, 3 weeks on and 1 week off taking as directed   Probiotic UNKNOWN take 1 capsule by oral route  every day taking as directed   progesterone micronized 100 mg capsule 100 mg take 1 capsule by oral route  every day in the evening taking as directed   testosterone 50 mg/5 gram (1 %) transdermal gel 50 mg/5 gram (1 %) apply (50MG)  by transdermal route  every day (1 tube = 50 mg) in the morning to shoulders and/or upper arms divided evenly between areas taking as directed     Allergies:  Medication Name Ingredient Reaction Comment   Neosporin ([id number]) BACITRACIN ZINC     Neosporin ([id number]-[id number]) POLYMYXIN B     Neosporin ([id number]-[id number]) BACITRACIN     Neosporin ([id number]-ojd-gatiw) NEOMYCIN SULFATE      Food allergies  citric acid, lactose     Vitals:  BP mm/Hg Pulse/min Resp/min Temp F Height (Total in.) Weight (lbs.) Weight (oz.) BMI   126/72 82   67.50 194.60  30.03     Smoking Status:    Use Status Type Smoking Status Usage Per Day Years Used Total Pack Years   no/never  Never smoker        Race:  White  Ethnicity:  Not  or   Preferred Language:  English